# Patient Record
Sex: FEMALE | Race: OTHER | NOT HISPANIC OR LATINO
[De-identification: names, ages, dates, MRNs, and addresses within clinical notes are randomized per-mention and may not be internally consistent; named-entity substitution may affect disease eponyms.]

---

## 2018-10-01 PROBLEM — Z00.00 ENCOUNTER FOR PREVENTIVE HEALTH EXAMINATION: Status: ACTIVE | Noted: 2018-10-01

## 2018-10-17 ENCOUNTER — APPOINTMENT (OUTPATIENT)
Dept: BARIATRICS | Facility: CLINIC | Age: 23
End: 2018-10-17
Payer: MEDICAID

## 2018-10-17 VITALS
BODY MASS INDEX: 38.34 KG/M2 | TEMPERATURE: 98.1 F | OXYGEN SATURATION: 99 % | WEIGHT: 230.13 LBS | SYSTOLIC BLOOD PRESSURE: 122 MMHG | HEIGHT: 65 IN | DIASTOLIC BLOOD PRESSURE: 83 MMHG | HEART RATE: 87 BPM

## 2018-10-17 DIAGNOSIS — E66.01 MORBID (SEVERE) OBESITY DUE TO EXCESS CALORIES: ICD-10-CM

## 2018-10-17 DIAGNOSIS — E28.2 POLYCYSTIC OVARIAN SYNDROME: ICD-10-CM

## 2018-10-17 DIAGNOSIS — Z83.49 FAMILY HISTORY OF OTHER ENDOCRINE, NUTRITIONAL AND METABOLIC DISEASES: ICD-10-CM

## 2018-10-17 DIAGNOSIS — Z83.3 FAMILY HISTORY OF DIABETES MELLITUS: ICD-10-CM

## 2018-10-17 DIAGNOSIS — E11.9 TYPE 2 DIABETES MELLITUS W/OUT COMPLICATIONS: ICD-10-CM

## 2018-10-17 DIAGNOSIS — Z78.9 OTHER SPECIFIED HEALTH STATUS: ICD-10-CM

## 2018-10-17 PROCEDURE — 99203 OFFICE O/P NEW LOW 30 MIN: CPT

## 2018-10-24 ENCOUNTER — APPOINTMENT (OUTPATIENT)
Dept: BARIATRICS | Facility: CLINIC | Age: 23
End: 2018-10-24

## 2018-10-31 ENCOUNTER — APPOINTMENT (OUTPATIENT)
Dept: BARIATRICS | Facility: CLINIC | Age: 23
End: 2018-10-31
Payer: MEDICAID

## 2018-10-31 VITALS — HEIGHT: 65 IN | BODY MASS INDEX: 38.91 KG/M2 | WEIGHT: 233.56 LBS

## 2018-10-31 PROCEDURE — 97802 MEDICAL NUTRITION INDIV IN: CPT

## 2018-11-21 ENCOUNTER — APPOINTMENT (OUTPATIENT)
Dept: BARIATRICS | Facility: CLINIC | Age: 23
End: 2018-11-21
Payer: MEDICAID

## 2018-11-21 VITALS
DIASTOLIC BLOOD PRESSURE: 82 MMHG | WEIGHT: 240.38 LBS | BODY MASS INDEX: 40.05 KG/M2 | OXYGEN SATURATION: 97 % | SYSTOLIC BLOOD PRESSURE: 128 MMHG | HEIGHT: 65 IN | TEMPERATURE: 97.7 F | HEART RATE: 86 BPM

## 2018-11-21 PROCEDURE — 99213 OFFICE O/P EST LOW 20 MIN: CPT

## 2018-12-11 ENCOUNTER — APPOINTMENT (OUTPATIENT)
Dept: BARIATRICS | Facility: HOSPITAL | Age: 23
End: 2018-12-11

## 2018-12-11 ENCOUNTER — RESULT REVIEW (OUTPATIENT)
Age: 23
End: 2018-12-11

## 2018-12-11 ENCOUNTER — INPATIENT (INPATIENT)
Facility: HOSPITAL | Age: 23
LOS: 1 days | Discharge: HOME | DRG: 621 | End: 2018-12-13
Attending: SURGERY | Admitting: SURGERY
Payer: MEDICAID

## 2018-12-11 VITALS
SYSTOLIC BLOOD PRESSURE: 132 MMHG | TEMPERATURE: 99 F | HEART RATE: 101 BPM | HEIGHT: 65 IN | WEIGHT: 242.29 LBS | DIASTOLIC BLOOD PRESSURE: 72 MMHG | OXYGEN SATURATION: 96 % | RESPIRATION RATE: 16 BRPM

## 2018-12-11 DIAGNOSIS — Z98.891 HISTORY OF UTERINE SCAR FROM PREVIOUS SURGERY: Chronic | ICD-10-CM

## 2018-12-11 LAB
HCT VFR BLD CALC: 37.4 % — SIGNIFICANT CHANGE UP (ref 34.5–45)
HGB BLD-MCNC: 13.2 G/DL — SIGNIFICANT CHANGE UP (ref 11.5–15.5)
MCHC RBC-ENTMCNC: 29.7 PG — SIGNIFICANT CHANGE UP (ref 27–34)
MCHC RBC-ENTMCNC: 35.3 G/DL — SIGNIFICANT CHANGE UP (ref 32–36)
MCV RBC AUTO: 84.2 FL — SIGNIFICANT CHANGE UP (ref 80–100)
PLATELET # BLD AUTO: 228 K/UL — SIGNIFICANT CHANGE UP (ref 150–400)
RBC # BLD: 4.44 M/UL — SIGNIFICANT CHANGE UP (ref 3.8–5.2)
RBC # FLD: 12.8 % — SIGNIFICANT CHANGE UP (ref 10.3–16.9)
WBC # BLD: 14.4 K/UL — HIGH (ref 3.8–10.5)
WBC # FLD AUTO: 14.4 K/UL — HIGH (ref 3.8–10.5)

## 2018-12-11 PROCEDURE — 43775 LAP SLEEVE GASTRECTOMY: CPT

## 2018-12-11 RX ORDER — OXYCODONE AND ACETAMINOPHEN 5; 325 MG/1; MG/1
1 TABLET ORAL EVERY 4 HOURS
Qty: 0 | Refills: 0 | Status: DISCONTINUED | OUTPATIENT
Start: 2018-12-11 | End: 2018-12-12

## 2018-12-11 RX ORDER — PANTOPRAZOLE SODIUM 20 MG/1
40 TABLET, DELAYED RELEASE ORAL
Qty: 0 | Refills: 0 | Status: DISCONTINUED | OUTPATIENT
Start: 2018-12-11 | End: 2018-12-13

## 2018-12-11 RX ORDER — HYDROMORPHONE HYDROCHLORIDE 2 MG/ML
0.5 INJECTION INTRAMUSCULAR; INTRAVENOUS; SUBCUTANEOUS
Qty: 0 | Refills: 0 | Status: DISCONTINUED | OUTPATIENT
Start: 2018-12-11 | End: 2018-12-12

## 2018-12-11 RX ORDER — ACETAMINOPHEN 500 MG
1000 TABLET ORAL ONCE
Qty: 0 | Refills: 0 | Status: COMPLETED | OUTPATIENT
Start: 2018-12-11 | End: 2018-12-11

## 2018-12-11 RX ORDER — SCOPALAMINE 1 MG/3D
1 PATCH, EXTENDED RELEASE TRANSDERMAL ONCE
Qty: 0 | Refills: 0 | Status: COMPLETED | OUTPATIENT
Start: 2018-12-11 | End: 2018-12-11

## 2018-12-11 RX ORDER — GABAPENTIN 400 MG/1
300 CAPSULE ORAL ONCE
Qty: 0 | Refills: 0 | Status: COMPLETED | OUTPATIENT
Start: 2018-12-11 | End: 2018-12-11

## 2018-12-11 RX ORDER — ONDANSETRON 8 MG/1
4 TABLET, FILM COATED ORAL EVERY 6 HOURS
Qty: 0 | Refills: 0 | Status: DISCONTINUED | OUTPATIENT
Start: 2018-12-11 | End: 2018-12-13

## 2018-12-11 RX ORDER — ACETAMINOPHEN 500 MG
650 TABLET ORAL EVERY 6 HOURS
Qty: 0 | Refills: 0 | Status: DISCONTINUED | OUTPATIENT
Start: 2018-12-11 | End: 2018-12-12

## 2018-12-11 RX ORDER — ENOXAPARIN SODIUM 100 MG/ML
40 INJECTION SUBCUTANEOUS ONCE
Qty: 0 | Refills: 0 | Status: COMPLETED | OUTPATIENT
Start: 2018-12-11 | End: 2018-12-11

## 2018-12-11 RX ORDER — SODIUM CHLORIDE 9 MG/ML
1000 INJECTION, SOLUTION INTRAVENOUS
Qty: 0 | Refills: 0 | Status: DISCONTINUED | OUTPATIENT
Start: 2018-12-11 | End: 2018-12-12

## 2018-12-11 RX ADMIN — OXYCODONE AND ACETAMINOPHEN 1 TABLET(S): 5; 325 TABLET ORAL at 21:25

## 2018-12-11 RX ADMIN — GABAPENTIN 300 MILLIGRAM(S): 400 CAPSULE ORAL at 12:46

## 2018-12-11 RX ADMIN — SCOPALAMINE 1 PATCH: 1 PATCH, EXTENDED RELEASE TRANSDERMAL at 12:47

## 2018-12-11 RX ADMIN — Medication 650 MILLIGRAM(S): at 23:00

## 2018-12-11 RX ADMIN — HYDROMORPHONE HYDROCHLORIDE 0.5 MILLIGRAM(S): 2 INJECTION INTRAMUSCULAR; INTRAVENOUS; SUBCUTANEOUS at 18:45

## 2018-12-11 RX ADMIN — HYDROMORPHONE HYDROCHLORIDE 0.5 MILLIGRAM(S): 2 INJECTION INTRAMUSCULAR; INTRAVENOUS; SUBCUTANEOUS at 19:00

## 2018-12-11 RX ADMIN — OXYCODONE AND ACETAMINOPHEN 1 TABLET(S): 5; 325 TABLET ORAL at 22:00

## 2018-12-11 RX ADMIN — Medication 650 MILLIGRAM(S): at 22:26

## 2018-12-11 RX ADMIN — Medication 1000 MILLIGRAM(S): at 12:46

## 2018-12-11 RX ADMIN — ENOXAPARIN SODIUM 40 MILLIGRAM(S): 100 INJECTION SUBCUTANEOUS at 12:47

## 2018-12-11 NOTE — BRIEF OPERATIVE NOTE - PROCEDURE
<<-----Click on this checkbox to enter Procedure Gastrectomy, sleeve, laparoscopic  12/11/2018    Active  GANAND

## 2018-12-11 NOTE — H&P ADULT - HISTORY OF PRESENT ILLNESS
Pt is a 24 yo F with morbid obesity (BMI 40) and pre-diabetes (not on meds) presents for elective sleeve gastrectomy. Currently feeling well, no complaints, ready for surgery. H/o c sect x 2. Pre op Hb 15.1.

## 2018-12-11 NOTE — H&P ADULT - ASSESSMENT
24 yo F with pre-diabetes and morbid obesity, to undergo laparoscopic sleeve gastrectomy today.    Plan:  - OR today  - afterward, admit to inpatient  - follow Dr. Crocker's bariatric protocol

## 2018-12-11 NOTE — BRIEF OPERATIVE NOTE - OPERATION/FINDINGS
Laparoscopic sleeve gastrectomy created over 36F Bougie. Greater omentopexy performed to gastric sleeve using running 3-0 PDS. Hemostasis achieved. Incisions closed primarily.

## 2018-12-12 LAB
ANION GAP SERPL CALC-SCNC: 9 MMOL/L — SIGNIFICANT CHANGE UP (ref 5–17)
BUN SERPL-MCNC: 9 MG/DL — SIGNIFICANT CHANGE UP (ref 7–23)
CALCIUM SERPL-MCNC: 9.2 MG/DL — SIGNIFICANT CHANGE UP (ref 8.4–10.5)
CHLORIDE SERPL-SCNC: 105 MMOL/L — SIGNIFICANT CHANGE UP (ref 96–108)
CO2 SERPL-SCNC: 27 MMOL/L — SIGNIFICANT CHANGE UP (ref 22–31)
CREAT SERPL-MCNC: 0.5 MG/DL — SIGNIFICANT CHANGE UP (ref 0.5–1.3)
GLUCOSE SERPL-MCNC: 127 MG/DL — HIGH (ref 70–99)
HCT VFR BLD CALC: 36.9 % — SIGNIFICANT CHANGE UP (ref 34.5–45)
HGB BLD-MCNC: 13.1 G/DL — SIGNIFICANT CHANGE UP (ref 11.5–15.5)
LYMPHOCYTES # BLD AUTO: 7.1 % — LOW (ref 13–44)
MAGNESIUM SERPL-MCNC: 1.8 MG/DL — SIGNIFICANT CHANGE UP (ref 1.6–2.6)
MCHC RBC-ENTMCNC: 30.1 PG — SIGNIFICANT CHANGE UP (ref 27–34)
MCHC RBC-ENTMCNC: 35.5 G/DL — SIGNIFICANT CHANGE UP (ref 32–36)
MCV RBC AUTO: 84.8 FL — SIGNIFICANT CHANGE UP (ref 80–100)
MONOCYTES NFR BLD AUTO: 7.1 % — SIGNIFICANT CHANGE UP (ref 2–14)
NEUTROPHILS NFR BLD AUTO: 85.8 % — HIGH (ref 43–77)
PHOSPHATE SERPL-MCNC: 2.6 MG/DL — SIGNIFICANT CHANGE UP (ref 2.5–4.5)
PLATELET # BLD AUTO: 232 K/UL — SIGNIFICANT CHANGE UP (ref 150–400)
POTASSIUM SERPL-MCNC: 4 MMOL/L — SIGNIFICANT CHANGE UP (ref 3.5–5.3)
POTASSIUM SERPL-SCNC: 4 MMOL/L — SIGNIFICANT CHANGE UP (ref 3.5–5.3)
RBC # BLD: 4.35 M/UL — SIGNIFICANT CHANGE UP (ref 3.8–5.2)
RBC # FLD: 12.9 % — SIGNIFICANT CHANGE UP (ref 10.3–16.9)
SODIUM SERPL-SCNC: 141 MMOL/L — SIGNIFICANT CHANGE UP (ref 135–145)
WBC # BLD: 13.8 K/UL — HIGH (ref 3.8–10.5)
WBC # FLD AUTO: 13.8 K/UL — HIGH (ref 3.8–10.5)

## 2018-12-12 RX ORDER — OXYCODONE AND ACETAMINOPHEN 5; 325 MG/1; MG/1
2 TABLET ORAL EVERY 4 HOURS
Qty: 0 | Refills: 0 | Status: DISCONTINUED | OUTPATIENT
Start: 2018-12-12 | End: 2018-12-13

## 2018-12-12 RX ORDER — ACETAMINOPHEN 500 MG
1000 TABLET ORAL ONCE
Qty: 0 | Refills: 0 | Status: COMPLETED | OUTPATIENT
Start: 2018-12-12 | End: 2018-12-12

## 2018-12-12 RX ORDER — INFLUENZA VIRUS VACCINE 15; 15; 15; 15 UG/.5ML; UG/.5ML; UG/.5ML; UG/.5ML
0.5 SUSPENSION INTRAMUSCULAR ONCE
Qty: 0 | Refills: 0 | Status: COMPLETED | OUTPATIENT
Start: 2018-12-12 | End: 2018-12-12

## 2018-12-12 RX ORDER — OXYCODONE AND ACETAMINOPHEN 5; 325 MG/1; MG/1
1 TABLET ORAL EVERY 4 HOURS
Qty: 0 | Refills: 0 | Status: DISCONTINUED | OUTPATIENT
Start: 2018-12-12 | End: 2018-12-13

## 2018-12-12 RX ORDER — MAGNESIUM SULFATE 500 MG/ML
1 VIAL (ML) INJECTION ONCE
Qty: 0 | Refills: 0 | Status: COMPLETED | OUTPATIENT
Start: 2018-12-12 | End: 2018-12-12

## 2018-12-12 RX ORDER — SODIUM CHLORIDE 9 MG/ML
1000 INJECTION, SOLUTION INTRAVENOUS
Qty: 0 | Refills: 0 | Status: DISCONTINUED | OUTPATIENT
Start: 2018-12-12 | End: 2018-12-13

## 2018-12-12 RX ADMIN — OXYCODONE AND ACETAMINOPHEN 1 TABLET(S): 5; 325 TABLET ORAL at 15:17

## 2018-12-12 RX ADMIN — OXYCODONE AND ACETAMINOPHEN 1 TABLET(S): 5; 325 TABLET ORAL at 14:26

## 2018-12-12 RX ADMIN — OXYCODONE AND ACETAMINOPHEN 1 TABLET(S): 5; 325 TABLET ORAL at 06:09

## 2018-12-12 RX ADMIN — Medication 400 MILLIGRAM(S): at 18:15

## 2018-12-12 RX ADMIN — OXYCODONE AND ACETAMINOPHEN 1 TABLET(S): 5; 325 TABLET ORAL at 10:18

## 2018-12-12 RX ADMIN — PANTOPRAZOLE SODIUM 40 MILLIGRAM(S): 20 TABLET, DELAYED RELEASE ORAL at 06:09

## 2018-12-12 RX ADMIN — SCOPALAMINE 1 PATCH: 1 PATCH, EXTENDED RELEASE TRANSDERMAL at 19:35

## 2018-12-12 RX ADMIN — OXYCODONE AND ACETAMINOPHEN 1 TABLET(S): 5; 325 TABLET ORAL at 06:40

## 2018-12-12 RX ADMIN — OXYCODONE AND ACETAMINOPHEN 1 TABLET(S): 5; 325 TABLET ORAL at 11:00

## 2018-12-12 RX ADMIN — SCOPALAMINE 1 PATCH: 1 PATCH, EXTENDED RELEASE TRANSDERMAL at 07:02

## 2018-12-12 RX ADMIN — OXYCODONE AND ACETAMINOPHEN 1 TABLET(S): 5; 325 TABLET ORAL at 02:22

## 2018-12-12 RX ADMIN — Medication 1000 MILLIGRAM(S): at 19:35

## 2018-12-12 RX ADMIN — ONDANSETRON 4 MILLIGRAM(S): 8 TABLET, FILM COATED ORAL at 17:46

## 2018-12-12 RX ADMIN — ONDANSETRON 4 MILLIGRAM(S): 8 TABLET, FILM COATED ORAL at 06:14

## 2018-12-12 RX ADMIN — OXYCODONE AND ACETAMINOPHEN 1 TABLET(S): 5; 325 TABLET ORAL at 01:52

## 2018-12-12 RX ADMIN — Medication 100 GRAM(S): at 10:04

## 2018-12-12 NOTE — DIETITIAN INITIAL EVALUATION ADULT. - OTHER INFO
Pt seen for consult- bariatric education. Admit: S/P sleeve gastrectomy 12/11. PMH: MO, pre-DM. Skin: no edema, +surgical incision. Pt seen resting in bed, awake, alert. Pt is on bariatric clear liquid diet, tolerating well. Reported .5kg i3gmtbxa ago, current wt 109.9kg. NKFA. Reporting V yesterday, now resolved (receiving zofran), denies C/D w/ last BM yesterday. Endorses pain, RN aware. RD provided in depth edu on diet advancement and specific nutrient needs S/P LSG. Pt receptive and expressed understanding. RD to follow up per protocol.

## 2018-12-12 NOTE — DIETITIAN INITIAL EVALUATION ADULT. - NS AS NUTRI INTERV ED CONTENT
Educated on bariatric diet progression, importance of adequate protein, hydration, and compliance w/ vitamin supplements. Pt expressed positive, verbal understanding; expected compliance is good.

## 2018-12-12 NOTE — PROGRESS NOTE ADULT - SUBJECTIVE AND OBJECTIVE BOX
OVERNIGHT EVENTS: POC wnl.  post hb 13.2. passed TOV  12/11: s/p lap sleeve gastrectomy     STATUS POST:  Laparoscopic sleeve gastrectomy    POST OPERATIVE DAY #: 1    SUBJECTIVE: Patient examined bedside with chief resident. Patient complains of nausea and incisional pain. Patient denies nausea ,chest pain and SOB.  Patient making adequate urine.      Vital Signs Last 24 Hrs  T(C): 37.3 (12 Dec 2018 06:03), Max: 37.3 (12 Dec 2018 06:03)  T(F): 99.2 (12 Dec 2018 06:03), Max: 99.2 (12 Dec 2018 06:03)  HR: 100 (12 Dec 2018 06:03) (97 - 117)  BP: 122/75 (12 Dec 2018 06:03) (95/46 - 132/72)  BP(mean): 73 (11 Dec 2018 20:30) (58 - 75)  RR: 16 (12 Dec 2018 06:03) (16 - 23)  SpO2: 97% (12 Dec 2018 06:03) (96% - 99%)  I&O's Detail    11 Dec 2018 07:01  -  12 Dec 2018 07:00  --------------------------------------------------------  IN:    lactated ringers.: 1425 mL    Other: 900 mL  Total IN: 2325 mL    OUT:    Voided: 2400 mL  Total OUT: 2400 mL    Total NET: -75 mL          General: NAD, resting comfortably in bed  C/V: NSR  Pulm: Nonlabored breathing, no respiratory distress  Abd:  Soft, non- distended, TTP around incision site, incision clean dry and intact   Extrem: WWP, no edema, SCDs in place      LABS:                        13.1   13.8  )-----------( 232      ( 12 Dec 2018 05:49 )             36.9     12-12    141  |  105  |  9   ----------------------------<  127<H>  4.0   |  27  |  0.50    Ca    9.2      12 Dec 2018 05:48  Phos  2.6     12-12  Mg     1.8     12-12            RADIOLOGY & ADDITIONAL STUDIES:

## 2018-12-12 NOTE — PROGRESS NOTE ADULT - ASSESSMENT
24 yo F with morbid obesity (BMI 40) and pre-diabetes (not on meds) presents for elective sleeve gastrectomy.  Patient s/p laparoscopic sleeve gastrectomy  BCLD  IVF  Pain/nausea control  OOB/AMB SCD'S  F/U am labs  Eliquis POD#4  Nutrition consult

## 2018-12-12 NOTE — DIETITIAN INITIAL EVALUATION ADULT. - ENERGY NEEDS
Ht: 165.1cm, Wt: 109.9kg, IBW: 56.8kg, 193.4%IBW.   Calculations done using IBW as pt's current wt is >120% IBW.  Above energy needs calculated for wt maintenance (20-25kcal/kg).  Weeks 1-2 estimated needs: 568-681kcal (10-12kcal/kg), 85-119gms pro (1.5-2.1gms pro/kg), >/=64oz clear fluids.

## 2018-12-13 ENCOUNTER — TRANSCRIPTION ENCOUNTER (OUTPATIENT)
Age: 23
End: 2018-12-13

## 2018-12-13 VITALS
TEMPERATURE: 99 F | RESPIRATION RATE: 16 BRPM | OXYGEN SATURATION: 97 % | HEART RATE: 60 BPM | SYSTOLIC BLOOD PRESSURE: 125 MMHG | DIASTOLIC BLOOD PRESSURE: 76 MMHG

## 2018-12-13 LAB
ANION GAP SERPL CALC-SCNC: 14 MMOL/L — SIGNIFICANT CHANGE UP (ref 5–17)
BUN SERPL-MCNC: 9 MG/DL — SIGNIFICANT CHANGE UP (ref 7–23)
CALCIUM SERPL-MCNC: 8.9 MG/DL — SIGNIFICANT CHANGE UP (ref 8.4–10.5)
CHLORIDE SERPL-SCNC: 103 MMOL/L — SIGNIFICANT CHANGE UP (ref 96–108)
CO2 SERPL-SCNC: 24 MMOL/L — SIGNIFICANT CHANGE UP (ref 22–31)
CREAT SERPL-MCNC: 0.56 MG/DL — SIGNIFICANT CHANGE UP (ref 0.5–1.3)
GLUCOSE SERPL-MCNC: 94 MG/DL — SIGNIFICANT CHANGE UP (ref 70–99)
HCT VFR BLD CALC: 36.7 % — SIGNIFICANT CHANGE UP (ref 34.5–45)
HGB BLD-MCNC: 12.9 G/DL — SIGNIFICANT CHANGE UP (ref 11.5–15.5)
MAGNESIUM SERPL-MCNC: 1.9 MG/DL — SIGNIFICANT CHANGE UP (ref 1.6–2.6)
MCHC RBC-ENTMCNC: 30.2 PG — SIGNIFICANT CHANGE UP (ref 27–34)
MCHC RBC-ENTMCNC: 35.1 G/DL — SIGNIFICANT CHANGE UP (ref 32–36)
MCV RBC AUTO: 85.9 FL — SIGNIFICANT CHANGE UP (ref 80–100)
PHOSPHATE SERPL-MCNC: 2.8 MG/DL — SIGNIFICANT CHANGE UP (ref 2.5–4.5)
PLATELET # BLD AUTO: 216 K/UL — SIGNIFICANT CHANGE UP (ref 150–400)
POTASSIUM SERPL-MCNC: 3.6 MMOL/L — SIGNIFICANT CHANGE UP (ref 3.5–5.3)
POTASSIUM SERPL-SCNC: 3.6 MMOL/L — SIGNIFICANT CHANGE UP (ref 3.5–5.3)
RBC # BLD: 4.27 M/UL — SIGNIFICANT CHANGE UP (ref 3.8–5.2)
RBC # FLD: 13.1 % — SIGNIFICANT CHANGE UP (ref 10.3–16.9)
SODIUM SERPL-SCNC: 141 MMOL/L — SIGNIFICANT CHANGE UP (ref 135–145)
WBC # BLD: 10.4 K/UL — SIGNIFICANT CHANGE UP (ref 3.8–10.5)
WBC # FLD AUTO: 10.4 K/UL — SIGNIFICANT CHANGE UP (ref 3.8–10.5)

## 2018-12-13 RX ORDER — POTASSIUM CHLORIDE 20 MEQ
10 PACKET (EA) ORAL
Qty: 0 | Refills: 0 | Status: DISCONTINUED | OUTPATIENT
Start: 2018-12-13 | End: 2018-12-13

## 2018-12-13 RX ORDER — POTASSIUM CHLORIDE 20 MEQ
40 PACKET (EA) ORAL ONCE
Qty: 0 | Refills: 0 | Status: COMPLETED | OUTPATIENT
Start: 2018-12-13 | End: 2018-12-13

## 2018-12-13 RX ORDER — ACETAMINOPHEN 500 MG
1000 TABLET ORAL ONCE
Qty: 0 | Refills: 0 | Status: COMPLETED | OUTPATIENT
Start: 2018-12-13 | End: 2018-12-13

## 2018-12-13 RX ORDER — APIXABAN 2.5 MG/1
1 TABLET, FILM COATED ORAL
Qty: 60 | Refills: 0 | OUTPATIENT
Start: 2018-12-13 | End: 2019-01-11

## 2018-12-13 RX ORDER — OMEPRAZOLE 10 MG/1
1 CAPSULE, DELAYED RELEASE ORAL
Qty: 30 | Refills: 0 | OUTPATIENT
Start: 2018-12-13 | End: 2019-01-11

## 2018-12-13 RX ADMIN — PANTOPRAZOLE SODIUM 40 MILLIGRAM(S): 20 TABLET, DELAYED RELEASE ORAL at 05:37

## 2018-12-13 RX ADMIN — Medication 100 MILLIEQUIVALENT(S): at 09:19

## 2018-12-13 RX ADMIN — Medication 40 MILLIEQUIVALENT(S): at 10:31

## 2018-12-13 RX ADMIN — Medication 1000 MILLIGRAM(S): at 02:42

## 2018-12-13 RX ADMIN — SCOPALAMINE 1 PATCH: 1 PATCH, EXTENDED RELEASE TRANSDERMAL at 05:36

## 2018-12-13 RX ADMIN — OXYCODONE AND ACETAMINOPHEN 1 TABLET(S): 5; 325 TABLET ORAL at 07:36

## 2018-12-13 RX ADMIN — Medication 400 MILLIGRAM(S): at 02:12

## 2018-12-13 RX ADMIN — OXYCODONE AND ACETAMINOPHEN 1 TABLET(S): 5; 325 TABLET ORAL at 08:36

## 2018-12-13 RX ADMIN — ONDANSETRON 4 MILLIGRAM(S): 8 TABLET, FILM COATED ORAL at 02:18

## 2018-12-13 NOTE — DISCHARGE NOTE ADULT - CARE PLAN
Principal Discharge DX:	Morbid obesity  Goal:	Recovery  Assessment and plan of treatment:	Follow up with Dr. Crocker in 1 week. Call the office at  to schedule your appointment. You may shower; soap and water over incision sites. Do not scrub. Pat dry when done. No tub bathing or swimming until cleared. Keep incision sites out of the sun as scars will darken. No heavy lifting (>10lbs) or strenuous exercise. Diet: Bariatric Full Fluids. 60 grams protein daily.  64 fluid ounces water daily. Drink small sips throughout the day. Continue diet as outlined by paperwork received as a pre-operative patient. You should be urinating at least 3-4x per day. Call the office if you experience increasing abdominal pain, nausea, vomiting, or temperature >100.4F.  NO ASPIRIN OR NSAIDs until approved by Dr. Crocker. Avoid alcoholic beverages until cleared by Dr. Crocker.

## 2018-12-13 NOTE — DISCHARGE NOTE ADULT - NS AS DC FOLLOWUP STROKE INST
Influenza vaccination (VIS Pub Date: August 7, 2015)/[employer] Patient Education: Surgical Wound Discharge Instructions

## 2018-12-13 NOTE — DISCHARGE NOTE ADULT - PATIENT PORTAL LINK FT
You can access the [facility], offered by [facility], by registering with the following website: [email or web address]

## 2018-12-13 NOTE — DISCHARGE NOTE ADULT - PLAN OF CARE
Recovery Follow up with Dr. Crocker in 1 week. Call the office at  to schedule your appointment. You may shower; soap and water over incision sites. Do not scrub. Pat dry when done. No tub bathing or swimming until cleared. Keep incision sites out of the sun as scars will darken. No heavy lifting (>10lbs) or strenuous exercise. Diet: Bariatric Full Fluids. 60 grams protein daily.  64 fluid ounces water daily. Drink small sips throughout the day. Continue diet as outlined by paperwork received as a pre-operative patient. You should be urinating at least 3-4x per day. Call the office if you experience increasing abdominal pain, nausea, vomiting, or temperature >100.4F.  NO ASPIRIN OR NSAIDs until approved by Dr. Crocker. Avoid alcoholic beverages until cleared by Dr. Crocker.

## 2018-12-13 NOTE — DISCHARGE NOTE ADULT - HOSPITAL COURSE
22 yo F with morbid obesity (BMI 40) and pre-diabetes (not on meds) presents for elective sleeve gastrectomy.  Patient s/p laparoscopic sleeve gastrectomy.Pt tolerated the procedure well. At time of discharge, pt was tolerating a bariatric clear liquid diet, and pt's pain was controlled. Plan is to follow up with Dr. Crocker  in the office.

## 2018-12-13 NOTE — DISCHARGE NOTE ADULT - CARE PROVIDER_API CALL
Steven Crocker (MD), Surgery  [address] [place] [address]  Phone: [phone]  Fax: [phone]

## 2018-12-13 NOTE — DISCHARGE NOTE ADULT - MEDICATION SUMMARY - MEDICATIONS TO TAKE
I will START or STAY ON the medications listed below when I get home from the hospital:    Percocet 5/325 oral tablet  -- 1 tab(s) by mouth every 6 hours, As Needed -for severe pain MDD:4   -- Caution federal law prohibits the transfer of this drug to any person other  than the person for whom it was prescribed.  May cause drowsiness.  Alcohol may intensify this effect.  Use care when operating dangerous machinery.  This prescription cannot be refilled.  This product contains acetaminophen.  Do not use  with any other product containing acetaminophen to prevent possible liver damage.  Using more of this medication than prescribed may cause serious breathing problems.    -- Indication: For Postoperative pain     Eliquis 2.5 mg oral tablet  -- 1 tab(s) by mouth 2 times a day MDD:2 tabs  -- Check with your doctor before becoming pregnant.  It is very important that you take or use this exactly as directed.  Do not skip doses or discontinue unless directed by your doctor.  Obtain medical advice before taking any non-prescription drugs as some may affect the action of this medication.    -- Indication: For Postoperative  acid reflux     omeprazole 40 mg oral delayed release capsule  -- 1 cap(s) by mouth once a day MDD:1  -- It is very important that you take or use this exactly as directed.  Do not skip doses or discontinue unless directed by your doctor.  Obtain medical advice before taking any non-prescription drugs as some may affect the action of this medication.  Swallow whole.  Do not crush.    -- Indication: For Postoperative  acid reflux

## 2018-12-13 NOTE — DISCHARGE NOTE ADULT - CARE PROVIDERS DIRECT ADDRESSES
[email or web address]

## 2018-12-13 NOTE — DISCHARGE NOTE ADULT - ADDITIONAL INSTRUCTIONS
1) Please take Percocet 1 to 2 tabs by mouth every 4 to 6 hours as needed for pain control.  2) Please start taking Eliquis Saturday December 15, 2018 2.5 mg twice a day by mouth for 30 days.  3) Please take Omeprazole 40 mg once a day by mouth.

## 2018-12-13 NOTE — DISCHARGE NOTE ADULT - INSTRUCTIONS
Follow progression of diet as you were instructed by Dr. Crocker. Call Dr. Crocker if you have any questions or concerns

## 2018-12-14 ENCOUNTER — OTHER (OUTPATIENT)
Age: 23
End: 2018-12-14

## 2018-12-15 DIAGNOSIS — E66.01 MORBID (SEVERE) OBESITY DUE TO EXCESS CALORIES: ICD-10-CM

## 2018-12-15 DIAGNOSIS — R73.03 PREDIABETES: ICD-10-CM

## 2018-12-15 DIAGNOSIS — K76.0 FATTY (CHANGE OF) LIVER, NOT ELSEWHERE CLASSIFIED: ICD-10-CM

## 2018-12-18 LAB — SURGICAL PATHOLOGY STUDY: SIGNIFICANT CHANGE UP

## 2018-12-26 ENCOUNTER — APPOINTMENT (OUTPATIENT)
Dept: BARIATRICS | Facility: CLINIC | Age: 23
End: 2018-12-26
Payer: MEDICAID

## 2018-12-26 VITALS
DIASTOLIC BLOOD PRESSURE: 76 MMHG | HEIGHT: 65 IN | WEIGHT: 223 LBS | HEART RATE: 72 BPM | BODY MASS INDEX: 37.15 KG/M2 | TEMPERATURE: 97.7 F | SYSTOLIC BLOOD PRESSURE: 112 MMHG | OXYGEN SATURATION: 100 %

## 2018-12-26 DIAGNOSIS — E66.9 OBESITY, UNSPECIFIED: ICD-10-CM

## 2018-12-26 PROBLEM — E66.01 MORBID (SEVERE) OBESITY DUE TO EXCESS CALORIES: Chronic | Status: ACTIVE | Noted: 2018-12-10

## 2018-12-26 PROCEDURE — 99024 POSTOP FOLLOW-UP VISIT: CPT

## 2019-01-09 PROCEDURE — 83735 ASSAY OF MAGNESIUM: CPT

## 2019-01-09 PROCEDURE — 80048 BASIC METABOLIC PNL TOTAL CA: CPT

## 2019-01-09 PROCEDURE — 86850 RBC ANTIBODY SCREEN: CPT

## 2019-01-09 PROCEDURE — 86901 BLOOD TYPING SEROLOGIC RH(D): CPT

## 2019-01-09 PROCEDURE — 85027 COMPLETE CBC AUTOMATED: CPT

## 2019-01-09 PROCEDURE — 86900 BLOOD TYPING SEROLOGIC ABO: CPT

## 2019-01-09 PROCEDURE — 36415 COLL VENOUS BLD VENIPUNCTURE: CPT

## 2019-01-09 PROCEDURE — 88307 TISSUE EXAM BY PATHOLOGIST: CPT

## 2019-01-09 PROCEDURE — 84100 ASSAY OF PHOSPHORUS: CPT

## 2019-01-09 PROCEDURE — 85025 COMPLETE CBC W/AUTO DIFF WBC: CPT

## 2019-02-05 ENCOUNTER — APPOINTMENT (OUTPATIENT)
Dept: BARIATRICS | Facility: CLINIC | Age: 24
End: 2019-02-05

## 2019-02-13 ENCOUNTER — APPOINTMENT (OUTPATIENT)
Dept: BARIATRICS | Facility: CLINIC | Age: 24
End: 2019-02-13

## 2019-02-20 NOTE — PROGRESS NOTE ADULT - SUBJECTIVE AND OBJECTIVE BOX
Procedure: LSG  Surgeon: Obi    S: Pt has no complaints. Denies CP, SOB, JUNG, calf tenderness. Pain controlled with medication.    O:  T(C): 36.2 (12-11-18 @ 18:45), Max: 36.2 (12-11-18 @ 18:35)  T(F): 97.2 (12-11-18 @ 18:45), Max: 97.2 (12-11-18 @ 18:35)  HR: 100 (12-11-18 @ 19:15) (100 - 114)  BP: 105/61 (12-11-18 @ 19:15) (95/46 - 107/55)  RR: 18 (12-11-18 @ 19:15) (16 - 23)  SpO2: 99% (12-11-18 @ 19:15) (98% - 99%)  Wt(kg): --            Gen: NAD, resting comfortably in bed  C/V: NSR  Pulm: Nonlabored breathing, no respiratory distress  Abd: soft, NT/ND Incision: CDI  Extrem: WWP, no calf edema, SCDs in place      A/P: 23yFemale s/p LSG  Diet: BCLD  IVF: LR  Pain/nausea control  DVT ppx: SCDs  TOV CHILLS/FEVER

## 2019-10-10 ENCOUNTER — MESSAGE (OUTPATIENT)
Age: 24
End: 2019-10-10

## 2022-03-11 NOTE — DISCHARGE NOTE ADULT - CONDITIONS AT DISCHARGE
Patient requests Rx stable, tolerated diet, lap puncture sites with dermabond remained C/D/I. Pt was observed ambulating in hallway/room independently with gait steady. self care with ADLs. voided freely adequate amount,

## 2024-08-01 ENCOUNTER — APPOINTMENT (OUTPATIENT)
Dept: ENDOCRINOLOGY | Facility: CLINIC | Age: 29
End: 2024-08-01